# Patient Record
Sex: MALE | Race: WHITE | URBAN - METROPOLITAN AREA
[De-identification: names, ages, dates, MRNs, and addresses within clinical notes are randomized per-mention and may not be internally consistent; named-entity substitution may affect disease eponyms.]

---

## 2017-06-01 ENCOUNTER — TELEPHONE (OUTPATIENT)
Dept: FAMILY MEDICINE | Facility: CLINIC | Age: 32
End: 2017-06-01

## 2017-06-01 ENCOUNTER — OFFICE VISIT (OUTPATIENT)
Dept: FAMILY MEDICINE | Facility: CLINIC | Age: 32
End: 2017-06-01
Payer: COMMERCIAL

## 2017-06-01 VITALS
TEMPERATURE: 98 F | SYSTOLIC BLOOD PRESSURE: 126 MMHG | DIASTOLIC BLOOD PRESSURE: 80 MMHG | BODY MASS INDEX: 25.37 KG/M2 | HEART RATE: 88 BPM | WEIGHT: 203 LBS

## 2017-06-01 DIAGNOSIS — R00.2 PALPITATIONS: Primary | ICD-10-CM

## 2017-06-01 DIAGNOSIS — F41.9 ANXIETY: ICD-10-CM

## 2017-06-01 LAB
ALBUMIN SERPL-MCNC: 4.2 G/DL (ref 3.4–5)
ALP SERPL-CCNC: 59 U/L (ref 40–150)
ALT SERPL W P-5'-P-CCNC: 22 U/L (ref 0–70)
ANION GAP SERPL CALCULATED.3IONS-SCNC: 10 MMOL/L (ref 3–14)
AST SERPL W P-5'-P-CCNC: 14 U/L (ref 0–45)
BILIRUB SERPL-MCNC: 0.4 MG/DL (ref 0.2–1.3)
BUN SERPL-MCNC: 26 MG/DL (ref 7–30)
CALCIUM SERPL-MCNC: 8.9 MG/DL (ref 8.5–10.1)
CHLORIDE SERPL-SCNC: 105 MMOL/L (ref 94–109)
CO2 SERPL-SCNC: 26 MMOL/L (ref 20–32)
CREAT SERPL-MCNC: 1.16 MG/DL (ref 0.66–1.25)
GFR SERPL CREATININE-BSD FRML MDRD: 74 ML/MIN/1.7M2
GLUCOSE SERPL-MCNC: 106 MG/DL (ref 70–99)
HGB BLD-MCNC: 16.6 G/DL (ref 13.3–17.7)
POTASSIUM SERPL-SCNC: 3.8 MMOL/L (ref 3.4–5.3)
PROT SERPL-MCNC: 8 G/DL (ref 6.8–8.8)
SODIUM SERPL-SCNC: 141 MMOL/L (ref 133–144)
TSH SERPL DL<=0.05 MIU/L-ACNC: 3.99 MU/L (ref 0.4–4)

## 2017-06-01 PROCEDURE — 80053 COMPREHEN METABOLIC PANEL: CPT | Performed by: FAMILY MEDICINE

## 2017-06-01 PROCEDURE — 99214 OFFICE O/P EST MOD 30 MIN: CPT | Performed by: FAMILY MEDICINE

## 2017-06-01 PROCEDURE — 93000 ELECTROCARDIOGRAM COMPLETE: CPT | Mod: 59 | Performed by: FAMILY MEDICINE

## 2017-06-01 PROCEDURE — 36415 COLL VENOUS BLD VENIPUNCTURE: CPT | Performed by: FAMILY MEDICINE

## 2017-06-01 PROCEDURE — 85018 HEMOGLOBIN: CPT | Performed by: FAMILY MEDICINE

## 2017-06-01 PROCEDURE — 84443 ASSAY THYROID STIM HORMONE: CPT | Performed by: FAMILY MEDICINE

## 2017-06-01 NOTE — MR AVS SNAPSHOT
After Visit Summary   6/1/2017    Maximo Barr    MRN: [id number]           Patient Information     Date Of Birth          1985        Visit Information        Provider Department      6/1/2017 10:20 AM Aravind Frey MD [facility] [place]        Today's Diagnoses     Palpitations    -  1    Anxiety           Follow-ups after your visit        Future tests that were ordered for you today     Open Future Orders        Priority Expected Expires Ordered    Holter Monitor 48 hour - Adult Routine  7/16/2017 6/1/2017            Who to contact     If you have questions or need follow up information about today's clinic visit or your schedule please contact [facility] [place] directly at [phone].  Normal or non-critical lab and imaging results will be communicated to you by [employer], letter or phone within 4 business days after the clinic has received the results. If you do not hear from us within 7 days, please contact the clinic through [employer] or phone. If you have a critical or abnormal lab result, we will notify you by phone as soon as possible.  Submit refill requests through [employer] or call your pharmacy and they will forward the refill request to us. Please allow 3 business days for your refill to be completed.          Additional Information About Your Visit        MyChart Information     [employer] gives you secure access to your electronic health record. If you see a primary care provider, you can also send messages to your care team and make appointments. If you have questions, please call your primary care clinic.  If you do not have a primary care provider, please call [phone] and they will assist you.        Care EveryWhere ID     This is your Care EveryWhere ID. This could be used by other organizations to access your [facility] medical records  [id number]        Your Vitals Were     Pulse Temperature BMI (Body Mass Index)             88 98  F (36.7  C)  (Tympanic) 25.37 kg/m2          Blood Pressure from Last 3 Encounters:   06/01/17 126/80   02/12/13 118/77   12/17/12 136/91    Weight from Last 3 Encounters:   06/01/17 203 lb (92.1 kg)   02/12/13 182 lb (82.6 kg)   12/17/12 186 lb (84.4 kg)              We Performed the Following     Comprehensive metabolic panel     EKG 12-lead complete w/read - Clinics     Hemoglobin     TSH        Primary Care Provider    None Specified       No primary provider on file.        Thank you!     Thank you for choosing [facility] [place]  for your care. Our goal is always to provide you with excellent care. Hearing back from our patients is one way we can continue to improve our services. Please take a few minutes to complete the written survey that you may receive in the mail after your visit with us. Thank you!             Your Updated Medication List - Protect others around you: Learn how to safely use, store and throw away your medicines at www.disposemymeds.org.      Notice  As of 6/1/2017 10:55 AM    You have not been prescribed any medications.

## 2017-06-01 NOTE — PROGRESS NOTES
SUBJECTIVE:                                                    Maximo Barr is a 32 year old male who presents to clinic today for the following health issues:      Concern - papitations     Onset: over the past week - worsening     Started school, had a court date of DUI. Feels heart palpitation, also some time sweating episodes.    No chest pain or dizziness.    Has issues with underline anxiety. Denies current palpitations    Description:   bp has been elevated 140s/, elevated resting HR per pt     Intensity: moderate    Progression of Symptoms:  worsening    Accompanying Signs & Symptoms:  difficulty getting a full breath occasionally        Previous history of similar problem:   No    Precipitating factors:   Worsened by: Nothing    Alleviating factors:  Improved by: Nothing       Therapies Tried and outcome: NONE         Problem list and histories reviewed & adjusted, as indicated.  Additional history: as documented    Patient Active Problem List   Diagnosis     CARDIOVASCULAR SCREENING; LDL GOAL LESS THAN 160     Anxiety     Tobacco abuse     Fatigue     Pre-diabetes     Vitamin D deficiency disease     Postconcussion syndrome     Smoker     Hip region mass     GERD (gastroesophageal reflux disease)     Past Surgical History:   Procedure Laterality Date     NO HISTORY OF SURGERY         Social History   Substance Use Topics     Smoking status: Former Smoker     Packs/day: 1.00     Years: 12.00     Types: Cigarettes     Quit date: 1/1/2015     Smokeless tobacco: Former User     Types: Chew     Alcohol use Yes     Family History   Problem Relation Age of Onset     DIABETES Other      DIABETES Maternal Uncle            Reviewed and updated as needed this visit by clinical staff  Tobacco  Allergies  Meds  Soc Hx      Reviewed and updated as needed this visit by Provider         ROS:  C: NEGATIVE for fever, chills, change in weight  E/M: NEGATIVE for ear, mouth and throat problems  R: NEGATIVE  for significant cough or SOB  CV: POSITIVE for palpitations    OBJECTIVE:                                                    /80 (BP Location: Right arm)  Pulse 88  Temp 98  F (36.7  C) (Tympanic)  Wt 203 lb (92.1 kg)  BMI 25.37 kg/m2  Body mass index is 25.37 kg/(m^2).   GENERAL: healthy, alert, well nourished, well hydrated, no distress  HENT: ear canals- normal; TMs- normal; Nose- normal; Mouth- no ulcers, no lesions  NECK: no tenderness, no adenopathy, no asymmetry, no masses, no stiffness; thyroid- normal to palpation  RESP: lungs clear to auscultation - no rales, no rhonchi, no wheezes  CV: regular rates and rhythm, normal S1 S2, no S3 or S4 and no murmur, no click or rub -         ASSESSMENT/PLAN:                                                        ICD-10-CM    1. Palpitations R00.2 Comprehensive metabolic panel     TSH     Hemoglobin     EKG 12-lead complete w/read - Clinics     Holter Monitor 48 hour - Adult   2. Anxiety F41.9        Patient EKG is normal, will do some labs and follow up on that.   Will do holter monitor and further evaluate,  He has underline anxiety which could be contributing to his symptoms and recent stressors, advice relaxation techniques, if its worse we can talk about low dose SSRI which can be helpful.  Warning signs were discussed with the patient.    Aravind Frey MD  [facility] [place]

## 2017-06-01 NOTE — TELEPHONE ENCOUNTER
Patient calling to schedule appointment to be evaluated has he has recently been having palpitations and BP has been elevated - 140s/90-100s. Palpitations started over the last week. Denies chest pain, sob, sweating, n/v, lightheadedness/ dizziness, fever/ chills, cough. States he has had heartburn lately. Noticed that if he eats right before he goes to bed he will wake up with palpitations. Palpitations seem to get better when he is exerting himself. Patient scheduled for 11:20 today with Dr. Frey. Routing to provider as AGUEDA.   Cristy Medina RN   [facility] - Triage

## 2017-06-01 NOTE — NURSING NOTE
"Chief Complaint   Patient presents with     Palpitations       Initial /80 (BP Location: Right arm)  Pulse 88  Temp 98  F (36.7  C) (Tympanic)  Wt 203 lb (92.1 kg)  BMI 25.37 kg/m2 Estimated body mass index is 25.37 kg/(m^2) as calculated from the following:    Height as of 12/17/12: 6' 3\" (1.905 m).    Weight as of this encounter: 203 lb (92.1 kg).  Medication Reconciliation: complete    No current outpatient prescriptions on file.       Jose David SOLIS CMA  "

## 2017-06-01 NOTE — LETTER
[address] [place] [address]   [phone]      June 6, 2017      Maximo Barr  [address]  [place] [address]            Dear Maximo,      I have reviewed your recent labs. Here are the results:     -Liver and gallbladder tests are normal. (ALT,AST, Alk phos, bilirubin), kidney function is normal (Cr, GFR), Sodium is normal, Potassium is normal, Calcium is normal, Glucose is normal (diabetes screening test).   -TSH (thyroid stimulating hormone) level is normal which indicates normal thyroid function.   -Hemoglobin is normal.  There is no evidence of anemia.   Enclosed is a copy of the results.  If you have any questions or concerns, please call the clinic at [phone] and make a follow up appointment with me.    Results for orders placed or performed in visit on 06/01/17   Comprehensive metabolic panel   Result Value Ref Range    Sodium 141 133 - 144 mmol/L    Potassium 3.8 3.4 - 5.3 mmol/L    Chloride 105 94 - 109 mmol/L    Carbon Dioxide 26 20 - 32 mmol/L    Anion Gap 10 3 - 14 mmol/L    Glucose 106 (H) 70 - 99 mg/dL    Urea Nitrogen 26 7 - 30 mg/dL    Creatinine 1.16 0.66 - 1.25 mg/dL    GFR Estimate 74 >60 mL/min/1.7m2    GFR Estimate If Black 89 >60 mL/min/1.7m2    Calcium 8.9 8.5 - 10.1 mg/dL    Bilirubin Total 0.4 0.2 - 1.3 mg/dL    Albumin 4.2 3.4 - 5.0 g/dL    Protein Total 8.0 6.8 - 8.8 g/dL    Alkaline Phosphatase 59 40 - 150 U/L    ALT 22 0 - 70 U/L    AST 14 0 - 45 U/L   TSH   Result Value Ref Range    TSH 3.99 0.40 - 4.00 mU/L   Hemoglobin   Result Value Ref Range    Hemoglobin 16.6 13.3 - 17.7 g/dL       Sincerely,    Aravind Frey M.D.

## 2017-06-27 ENCOUNTER — HOSPITAL ENCOUNTER (OUTPATIENT)
Dept: CARDIOLOGY | Facility: CLINIC | Age: 32
Discharge: HOME | End: 2017-06-27
Attending: FAMILY MEDICINE | Admitting: FAMILY MEDICINE
Payer: COMMERCIAL

## 2017-06-27 DIAGNOSIS — R00.2 PALPITATIONS: ICD-10-CM

## 2017-06-27 PROCEDURE — 93226 XTRNL ECG REC<48 HR SCAN A/R: CPT

## 2017-06-27 PROCEDURE — 93227 XTRNL ECG REC<48 HR R&I: CPT | Performed by: INTERNAL MEDICINE

## 2017-07-05 ENCOUNTER — TELEPHONE (OUTPATIENT)
Dept: FAMILY MEDICINE | Facility: CLINIC | Age: 32
End: 2017-07-05

## 2018-04-04 ENCOUNTER — OFFICE VISIT (OUTPATIENT)
Dept: FAMILY MEDICINE | Facility: CLINIC | Age: 33
End: 2018-04-04
Payer: COMMERCIAL

## 2018-04-04 VITALS
TEMPERATURE: 98.5 F | DIASTOLIC BLOOD PRESSURE: 85 MMHG | SYSTOLIC BLOOD PRESSURE: 125 MMHG | HEART RATE: 84 BPM | WEIGHT: 205 LBS | OXYGEN SATURATION: 97 % | BODY MASS INDEX: 25.49 KG/M2 | HEIGHT: 75 IN

## 2018-04-04 DIAGNOSIS — J30.2 SEASONAL ALLERGIC RHINITIS, UNSPECIFIED CHRONICITY, UNSPECIFIED TRIGGER: ICD-10-CM

## 2018-04-04 DIAGNOSIS — R07.0 THROAT PAIN: Primary | ICD-10-CM

## 2018-04-04 LAB
BACTERIA SPEC CULT: NORMAL
BACTERIA SPEC CULT: NORMAL
DEPRECATED S PYO AG THROAT QL EIA: NORMAL
SPECIMEN SOURCE: NORMAL
SPECIMEN SOURCE: NORMAL

## 2018-04-04 PROCEDURE — 87880 STREP A ASSAY W/OPTIC: CPT | Performed by: FAMILY MEDICINE

## 2018-04-04 PROCEDURE — 99213 OFFICE O/P EST LOW 20 MIN: CPT | Performed by: FAMILY MEDICINE

## 2018-04-04 PROCEDURE — 87081 CULTURE SCREEN ONLY: CPT | Performed by: FAMILY MEDICINE

## 2018-04-04 RX ORDER — FEXOFENADINE HCL 180 MG/1
180 TABLET ORAL DAILY
Qty: 30 TABLET | Refills: 0 | Status: SHIPPED | OUTPATIENT
Start: 2018-04-04

## 2018-04-04 NOTE — PROGRESS NOTES
SUBJECTIVE:   Maximo Barr is a 32 year old male who presents to clinic today for the following health issues:      Swollen Lymph Nodes       Duration: 1 week     Description  Location: both sides/center of neck area, near jana apple, pain and difficulty with swallowing . Throat feels tender. May have bene exposed to strep couple of weeks ago.  he was also feeling sick with vomiting and stomach upset couple of weeks ago but those sx have resolved now.  No associated fever or chills. May be some fatigue but it could be because of not enough sleep , as his work shift has changed recently.  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   Tenderness : yes  Swelling: Concerned with possible swollen and tender glands    has mild cough sometimes , some phlegm although cough has been there slightly longer, likely related to possible postnasal drip sometime he sneezes,  and mostly likely mild allergy related.  It does worse in winter when it get dry,  but not taking any med's  for this bc it is not too bad     Intensity:  moderate    Accompanying signs and symptoms: No sore throat     History (similar episodes/previous evaluation): None    Precipitating or alleviating factors:  New exposures:  None  Recent travel: no      Therapies tried and outcome: no    Problem list and histories reviewed & adjusted, as indicated.  Additional history: as documented    Patient Active Problem List   Diagnosis     CARDIOVASCULAR SCREENING; LDL GOAL LESS THAN 160     Anxiety     Tobacco abuse     Fatigue     Pre-diabetes     Vitamin D deficiency disease     Postconcussion syndrome     Smoker     Hip region mass     GERD (gastroesophageal reflux disease)     Past Surgical History:   Procedure Laterality Date     NO HISTORY OF SURGERY         Social History   Substance Use Topics     Smoking status: Former Smoker     Packs/day: 1.00     Years: 12.00     Types: Cigarettes     Quit  "date: 1/1/2015     Smokeless tobacco: Former User     Types: Chew     Alcohol use Yes     Family History   Problem Relation Age of Onset     DIABETES Other      DIABETES Maternal Uncle            Reviewed and updated as needed this visit by clinical staff       Reviewed and updated as needed this visit by Provider         ROS:  Constitutional, HEENT, cardiovascular, pulmonary, GI, , musculoskeletal, neuro, skin, endocrine and psych systems are negative, except as otherwise noted.    OBJECTIVE:     /85  Pulse 84  Temp 98.5  F (36.9  C) (Tympanic)  Ht 6' 2.75\" (1.899 m)  Wt 205 lb (93 kg)  SpO2 97%  BMI 25.79 kg/m2  Body mass index is 25.79 kg/(m^2).  GENERAL: healthy, alert and no distress  EYES: Eyes grossly normal to inspection, PERRL and conjunctivae and sclerae normal  HENT: ear canals and TM's normal, nasal mucosa edematous  and oral mucous membranes moist, Throat with mild pharyngeal erythema, no sinus  tenderness  NECK: no adenopathy, although reports minimal tenderness along the anterior cervical chain bilaterally, no asymmetry, masses,  and thyroid normal to palpation  RESP: lungs clear to auscultation - no rales, rhonchi or wheezes  CV: regular rate and rhythm, normal S1 S2, no S3 or S4,   ABDOMEN: soft, nontender, no hepatosplenomegaly, no masses and bowel sounds normal      ASSESSMENT/PLAN:       (R07.0) Throat pain  (primary encounter diagnosis)  Comment:   Plan: Strep, Rapid Screen, fexofenadine (ALLEGRA) 180        MG tablet            (J30.2) Seasonal allergic rhinitis, unspecified chronicity, unspecified trigger  Comment:   Plan:     Discussed possible differential diagnosis for his throat discomfort.   Rapid strep negative, strep culture pending. Call and treat only if positive.  He possible had some recent URI, also suspect that he has some allergies, postnasal drip could be causing some throat discomfort.  Is willing to try OTC Allegra to see if that would help   in the meantime " cares and symptomatic treatment discussed.  He will do follow up if improvement or any ongoing problem       Patient expressed understanding and agreement with treatment plan. All patient's questions were answered, will let me know if has more later.  Medications: Rx's: Reviewed the potential side effects/complications of medications prescribed.       Emiliana Coy MD  [facility] [place]

## 2018-04-04 NOTE — PATIENT INSTRUCTIONS
Understanding Nasal Allergies  Nasal allergies (also called allergic rhinitis) are a common health problem. They may be seasonal. This means they cause symptoms only at certain times of the year. Or they may be perennial. This means they cause symptoms all year long. Other health problems, such as asthma, often occur along with allergies as well.    What is an allergic reaction?  An allergy is a reaction to a substance called an allergen. Common allergens include:    Wind-borne pollen    Mold    Dust mites    Furry and feathered animals    Cockroaches  Normally, allergens are harmless. But when a person has allergies, the body thinks they are harmful. The body then attacks allergens with antibodies. Antibodies are attached to special cells called mast cells. Allergens stick to the antibodies. This makes the mast cells release histamine and other chemicals. This is an allergic reaction. The chemicals irritate nearby nasal tissue. This causes nasal allergy symptoms.  Common nasal allergy symptoms  Allergies can cause nasal tissue to swell. This makes the air passages smaller. The nose may feel stuffed up. The nose may also make extra mucus, which can plug the nasal passages or drip out of the nose. Mucus can drip down the back of the throat (postnasal drip) as well. Sinus tissue can swell. This may cause pain and headache. Common allergy symptoms include:    Runny nose with clear, watery discharge    Stuffy nose (nasal congestion)    Drainage down your throat (postnasal drip)    Sneezing    Red, watery eyes    Itchy nose, eyes, ears, and throat    Plugged-up ears (ear congestion)    Sore throat    Coughing    Sinus pain and swelling    Headache  It may not be allergies  Other health problems can cause symptoms like those of nasal allergies. These include:    Nonallergic rhinitis and viruses such as colds    Irritants and pollutants, such as strong odors or smoke    Certain medicines    Changes in the weather    Treatment  Your healthcare provider will evaluate you to find the cause of your symptoms then recommend treatment. If your symptoms are due to nasal allergies, your healthcare provider may prescribe nasal steroid sprays or oral antihistamines to help reduce symptoms. Avoidance of the allergen will also be suggested. You may also be referred to an allergist.   Date Last Reviewed: 10/1/2016    8232-3939 [employer]. [address] [place] [address], [place] [address]. All rights reserved. This information is not intended as a substitute for professional medical care. Always follow your healthcare professional's instructions.

## 2018-04-04 NOTE — NURSING NOTE
"Chief Complaint   Patient presents with     Mass       Initial /85  Pulse 84  Temp 98.5  F (36.9  C) (Tympanic)  Ht 6' 2.75\" (1.899 m)  Wt 205 lb (93 kg)  SpO2 97%  BMI 25.79 kg/m2 Estimated body mass index is 25.79 kg/(m^2) as calculated from the following:    Height as of this encounter: 6' 2.75\" (1.899 m).    Weight as of this encounter: 205 lb (93 kg).  Medication Reconciliation: complete  "

## 2018-04-04 NOTE — MR AVS SNAPSHOT
After Visit Summary   4/4/2018    Maximo Barr    MRN: [id number]           Patient Information     Date Of Birth          1985        Visit Information        Provider Department      4/4/2018 9:20 AM Emiliana Coy MD [facility] [place]        Today's Diagnoses     Throat pain    -  1    Seasonal allergic rhinitis, unspecified chronicity, unspecified trigger          Care Instructions      Understanding Nasal Allergies  Nasal allergies (also called allergic rhinitis) are a common health problem. They may be seasonal. This means they cause symptoms only at certain times of the year. Or they may be perennial. This means they cause symptoms all year long. Other health problems, such as asthma, often occur along with allergies as well.    What is an allergic reaction?  An allergy is a reaction to a substance called an allergen. Common allergens include:    Wind-borne pollen    Mold    Dust mites    Furry and feathered animals    Cockroaches  Normally, allergens are harmless. But when a person has allergies, the body thinks they are harmful. The body then attacks allergens with antibodies. Antibodies are attached to special cells called mast cells. Allergens stick to the antibodies. This makes the mast cells release histamine and other chemicals. This is an allergic reaction. The chemicals irritate nearby nasal tissue. This causes nasal allergy symptoms.  Common nasal allergy symptoms  Allergies can cause nasal tissue to swell. This makes the air passages smaller. The nose may feel stuffed up. The nose may also make extra mucus, which can plug the nasal passages or drip out of the nose. Mucus can drip down the back of the throat (postnasal drip) as well. Sinus tissue can swell. This may cause pain and headache. Common allergy symptoms include:    Runny nose with clear, watery discharge    Stuffy nose (nasal congestion)    Drainage down your throat (postnasal  drip)    Sneezing    Red, watery eyes    Itchy nose, eyes, ears, and throat    Plugged-up ears (ear congestion)    Sore throat    Coughing    Sinus pain and swelling    Headache  It may not be allergies  Other health problems can cause symptoms like those of nasal allergies. These include:    Nonallergic rhinitis and viruses such as colds    Irritants and pollutants, such as strong odors or smoke    Certain medicines    Changes in the weather   Treatment  Your healthcare provider will evaluate you to find the cause of your symptoms then recommend treatment. If your symptoms are due to nasal allergies, your healthcare provider may prescribe nasal steroid sprays or oral antihistamines to help reduce symptoms. Avoidance of the allergen will also be suggested. You may also be referred to an allergist.   Date Last Reviewed: 10/1/2016    8615-4290 [employer]. [address] [place] [address]. All rights reserved. This information is not intended as a substitute for professional medical care. Always follow your healthcare professional's instructions.                Follow-ups after your visit        Follow-up notes from your care team     Return in about 4 weeks (around 5/2/2018).      Who to contact     If you have questions or need follow up information about today's clinic visit or your schedule please contact [facility] [place] directly at [phone].  Normal or non-critical lab and imaging results will be communicated to you by MyChart, letter or phone within 4 business days after the clinic has received the results. If you do not hear from us within 7 days, please contact the clinic through MyChart or phone. If you have a critical or abnormal lab result, we will notify you by phone as soon as possible.  Submit refill requests through [employer] or call your pharmacy and they will forward the refill request to us. Please allow 3 business days for your refill to be completed.           "Additional Information About Your Visit        MyChart Information     [employer] gives you secure access to your electronic health record. If you see a primary care provider, you can also send messages to your care team and make appointments. If you have questions, please call your primary care clinic.  If you do not have a primary care provider, please call [phone] and they will assist you.        Care EveryWhere ID     This is your Care EveryWhere ID. This could be used by other organizations to access your [facility] medical records  [id number]        Your Vitals Were     Pulse Temperature Height Pulse Oximetry BMI (Body Mass Index)       84 98.5  F (36.9  C) (Tympanic) 6' 2.75\" (1.899 m) 97% 25.79 kg/m2        Blood Pressure from Last 3 Encounters:   04/04/18 125/85   06/01/17 126/80   02/12/13 118/77    Weight from Last 3 Encounters:   04/04/18 205 lb (93 kg)   06/01/17 203 lb (92.1 kg)   02/12/13 182 lb (82.6 kg)              We Performed the Following     Beta strep group A culture     Strep, Rapid Screen          Today's Medication Changes          These changes are accurate as of 4/4/18 10:44 AM.  If you have any questions, ask your nurse or doctor.               Start taking these medicines.        Dose/Directions    fexofenadine 180 MG tablet   Commonly known as:  ALLEGRA   Used for:  Throat pain   Started by:  Emiliana Coy MD        Dose:  180 mg   Take 1 tablet (180 mg) by mouth daily   Quantity:  30 tablet   Refills:  0            Where to get your medicines      These medications were [place] [facility] [place] [facility] [place], [address]  [address] [facility] [address]     Phone:  [phone]     fexofenadine 180 MG tablet                Primary Care Provider     Stacy Bello MD       No address on file        Equal Access to Services     DINH NICOLAS AH: Samaria Blanco, wamichaelda joshua, qaybta kacruz sands " joselyn macjalyn burrowsaan ah. So [facility] [phone].    ATENCIÓN: Si habla yury, tiene a kennedy disposición servicios gratuitos de asistencia lingüística. Teresa al [phone].    We comply with applicable federal civil rights laws and [place] laws. We do not discriminate on the basis of race, color, national origin, age, disability, sex, sexual orientation, or gender identity.            Thank you!     [facility]. Our goal is always to provide you with excellent care. Hearing back from our patients is one way we can continue to improve our services. Please take a few minutes to complete the written survey that you may receive in the mail after your visit with us. Thank you!             Your Updated Medication List - Protect others around you: Learn how to safely use, store and throw away your medicines at www.disposemymeds.org.          This list is accurate as of 4/4/18 10:44 AM.  Always use your most recent med list.                   Brand Name Dispense Instructions for use Diagnosis    fexofenadine 180 MG tablet    ALLEGRA    30 tablet    Take 1 tablet (180 mg) by mouth daily    Throat pain

## 2018-04-05 LAB
BACTERIA SPEC CULT: NORMAL
SPECIMEN SOURCE: NORMAL

## 2018-12-03 ENCOUNTER — OFFICE VISIT (OUTPATIENT)
Dept: INTERNAL MEDICINE | Facility: CLINIC | Age: 33
End: 2018-12-03
Payer: COMMERCIAL

## 2018-12-03 VITALS
WEIGHT: 212 LBS | SYSTOLIC BLOOD PRESSURE: 110 MMHG | TEMPERATURE: 98.8 F | DIASTOLIC BLOOD PRESSURE: 68 MMHG | HEART RATE: 71 BPM | HEIGHT: 75 IN | OXYGEN SATURATION: 96 % | BODY MASS INDEX: 26.36 KG/M2

## 2018-12-03 DIAGNOSIS — R31.0 GROSS HEMATURIA: ICD-10-CM

## 2018-12-03 DIAGNOSIS — R35.0 URINARY FREQUENCY: Primary | ICD-10-CM

## 2018-12-03 LAB
ALBUMIN UR-MCNC: NEGATIVE MG/DL
APPEARANCE UR: CLEAR
BILIRUB UR QL STRIP: NEGATIVE
COLOR UR AUTO: YELLOW
GLUCOSE UR STRIP-MCNC: NEGATIVE MG/DL
HGB UR QL STRIP: ABNORMAL
KETONES UR STRIP-MCNC: NEGATIVE MG/DL
LEUKOCYTE ESTERASE UR QL STRIP: NEGATIVE
NITRATE UR QL: NEGATIVE
PH UR STRIP: 7.5 PH (ref 5–7)
RBC #/AREA URNS AUTO: NORMAL /HPF
SOURCE: ABNORMAL
SP GR UR STRIP: 1.01 (ref 1–1.03)
UROBILINOGEN UR STRIP-ACNC: 0.2 EU/DL (ref 0.2–1)
WBC #/AREA URNS AUTO: NORMAL /HPF

## 2018-12-03 PROCEDURE — 99213 OFFICE O/P EST LOW 20 MIN: CPT | Performed by: INTERNAL MEDICINE

## 2018-12-03 PROCEDURE — 81001 URINALYSIS AUTO W/SCOPE: CPT | Performed by: INTERNAL MEDICINE

## 2018-12-03 NOTE — MR AVS SNAPSHOT
After Visit Summary   12/3/2018    Maximo Garvin    MRN: [id number]           Patient Information     Date Of Birth          1985        Visit Information        Provider Department      12/3/2018 11:20 AM Sahil Cotto MD [facility]        Today's Diagnoses     Urinary frequency    -  1    Gross hematuria           Follow-ups after your visit        Future tests that were ordered for you today     Open Future Orders        Priority Expected Expires Ordered    US Renal Complete Routine  12/3/2019 12/3/2018            Who to contact     If you have questions or need follow up information about today's clinic visit or your schedule please contact [facility] directly at [phone].  Normal or non-critical lab and imaging results will be communicated to you by MyChart, letter or phone within 4 business days after the clinic has received the results. If you do not hear from us within 7 days, please contact the clinic through [employer] or phone. If you have a critical or abnormal lab result, we will notify you by phone as soon as possible.  Submit refill requests through [email or web address] or call your pharmacy and they will forward the refill request to us. Please allow 3 business days for your refill to be completed.          Additional Information About Your Visit        MyChart Information     [email or web address] gives you secure access to your electronic health record. If you see a primary care provider, you can also send messages to your care team and make appointments. If you have questions, please call your primary care clinic.  If you do not have a primary care provider, please call [phone] and they will assist you.        Care EveryWhere ID     This is your Care EveryWhere ID. This could be used by other organizations to access your [facility] medical records  [id number]        Your Vitals Were     Pulse Temperature Height Pulse Oximetry BMI (Body Mass Index)       71  "98.8  F (37.1  C) (Oral) 6' 2.75\" (1.899 m) 96% 26.68 kg/m2        Blood Pressure from Last 3 Encounters:   12/03/18 110/68   04/04/18 125/85   06/01/17 126/80    Weight from Last 3 Encounters:   12/03/18 212 lb (96.2 kg)   04/04/18 205 lb (93 kg)   06/01/17 203 lb (92.1 kg)              We Performed the Following     UA reflex to Microscopic and Culture     Urine Microscopic        Primary Care Provider     Clinic MD Eugenia       No address on file        Equal Access to Services     [facility]: Hadporfirio Blanco, elyse lewis, jimena morel, cruz velasquez . So [facility] [phone].    ATENCIÓN: Si habla español, tiene a kennedy disposición servicios gratuitos de asistencia lingüística. Llame al [phone].    We comply with applicable federal civil rights laws and [place] laws. We do not discriminate on the basis of race, color, national origin, age, disability, sex, sexual orientation, or gender identity.            Thank you!     Thank you for choosing [facility]  for your care. Our goal is always to provide you with excellent care. Hearing back from our patients is one way we can continue to improve our services. Please take a few minutes to complete the written survey that you may receive in the mail after your visit with us. Thank you!             Your Updated Medication List - Protect others around you: Learn how to safely use, store and throw away your medicines at www.disposemymeds.org.          This list is accurate as of 12/3/18  1:35 PM.  Always use your most recent med list.                   Brand Name Dispense Instructions for use Diagnosis    fexofenadine 180 MG tablet    ALLEGRA    30 tablet    Take 1 tablet (180 mg) by mouth daily    Throat pain         "

## 2018-12-03 NOTE — NURSING NOTE
"Vital signs:  Temp: 98.8  F (37.1  C) Temp src: Oral BP: 110/68 Pulse: 71     SpO2: 96 %     Height: 6' 2.75\" (189.9 cm) Weight: 212 lb (96.2 kg)  Estimated body mass index is 26.68 kg/(m^2) as calculated from the following:    Height as of this encounter: 6' 2.75\" (1.899 m).    Weight as of this encounter: 212 lb (96.2 kg).          "

## 2018-12-03 NOTE — PROGRESS NOTES
"  SUBJECTIVE:   Maximo Garvin is a 33 year old male who presents to clinic today for the following health issues:      Urinary frequency and discomfort:    Presents with sympoms of urinary frequency, burning. Had blood in the urine. Now has cleared.  Present for 2 days. No  Suprapubic or flank tenderness. No fevers, chills. No nausea, vomiting. No prior history of UTIs. No chronic medical conditions.        Problem list and histories reviewed & adjusted, as indicated.  Additional history: none    Patient Active Problem List   Diagnosis     CARDIOVASCULAR SCREENING; LDL GOAL LESS THAN 160     Anxiety     Tobacco abuse     Fatigue     Pre-diabetes     Vitamin D deficiency disease     Postconcussion syndrome     Smoker     Hip region mass     GERD (gastroesophageal reflux disease)     Past Surgical History:   Procedure Laterality Date     NO HISTORY OF SURGERY         Social History   Substance Use Topics     Smoking status: Former Smoker     Packs/day: 1.00     Years: 12.00     Types: Cigarettes     Quit date: 1/1/2015     Smokeless tobacco: Former User     Types: Chew     Alcohol use Yes      Comment: moderate     Family History   Problem Relation Age of Onset     Diabetes Other      Diabetes Maternal Uncle          Current Outpatient Prescriptions   Medication Sig Dispense Refill     fexofenadine (ALLEGRA) 180 MG tablet Take 1 tablet (180 mg) by mouth daily 30 tablet 0       Reviewed and updated as needed this visit by clinical staff       Reviewed and updated as needed this visit by Provider         ROS:  Constitutional, HEENT, cardiovascular, pulmonary, gi and gu systems are negative, except as otherwise noted.    OBJECTIVE:     /68  Pulse 71  Temp 98.8  F (37.1  C) (Oral)  Ht 6' 2.75\" (1.899 m)  Wt 212 lb (96.2 kg)  SpO2 96%  BMI 26.68 kg/m2  Body mass index is 26.68 kg/(m^2).   GENERAL: healthy, alert and no distress  NECK: no adenopathy, no asymmetry, masses, or scars and thyroid normal to " palpation  RESP: lungs clear to auscultation - no rales, rhonchi or wheezes  CV: regular rate and rhythm, normal S1 S2, no S3 or S4, no murmur, click or rub, no peripheral edema and peripheral pulses strong  ABDOMEN: soft, nontender, no hepatosplenomegaly, no masses and bowel sounds normal  MS: no gross musculoskeletal defects noted, no edema    Diagnostic Test Results:  Results for orders placed or performed in visit on 12/03/18 (from the past 24 hour(s))   UA reflex to Microscopic and Culture   Result Value Ref Range    Color Urine Yellow     Appearance Urine Clear     Glucose Urine Negative NEG^Negative mg/dL    Bilirubin Urine Negative NEG^Negative    Ketones Urine Negative NEG^Negative mg/dL    Specific Gravity Urine 1.015 1.003 - 1.035    Blood Urine Small (A) NEG^Negative    pH Urine 7.5 (H) 5.0 - 7.0 pH    Protein Albumin Urine Negative NEG^Negative mg/dL    Urobilinogen Urine 0.2 0.2 - 1.0 EU/dL    Nitrite Urine Negative NEG^Negative    Leukocyte Esterase Urine Negative NEG^Negative    Source Midstream Urine    Urine Microscopic   Result Value Ref Range    WBC Urine 0 - 5 OTO5^0 - 5 /HPF    RBC Urine O - 2 OTO2^O - 2 /HPF       ASSESSMENT/PLAN:     Problem List Items Addressed This Visit     None      Visit Diagnoses     Urinary frequency    -  Primary    Relevant Orders    UA reflex to Microscopic and Culture (Completed)    Urine Microscopic (Completed)    US Renal Complete    Gross hematuria        Relevant Orders    US Renal Complete           Assess UA, negative for UTI, positive for blood  Possible kidney stone, assess renal US  Keep fluid intake   Reassess with recurrent or changed symptoms      Follow-Up:with results     Sahil Cotto MD  [facility]

## 2018-12-10 ENCOUNTER — HOSPITAL ENCOUNTER (OUTPATIENT)
Dept: ULTRASOUND IMAGING | Facility: CLINIC | Age: 33
Discharge: HOME | End: 2018-12-10
Attending: INTERNAL MEDICINE | Admitting: INTERNAL MEDICINE
Payer: COMMERCIAL

## 2018-12-10 DIAGNOSIS — R35.0 URINARY FREQUENCY: ICD-10-CM

## 2018-12-10 DIAGNOSIS — R31.0 GROSS HEMATURIA: ICD-10-CM

## 2018-12-10 PROCEDURE — 76770 US EXAM ABDO BACK WALL COMP: CPT

## 2019-07-24 ENCOUNTER — ANCILLARY PROCEDURE (OUTPATIENT)
Dept: GENERAL RADIOLOGY | Facility: CLINIC | Age: 34
End: 2019-07-24
Attending: PHYSICIAN ASSISTANT
Payer: COMMERCIAL

## 2019-07-24 ENCOUNTER — OFFICE VISIT (OUTPATIENT)
Dept: INTERNAL MEDICINE | Facility: CLINIC | Age: 34
End: 2019-07-24
Payer: COMMERCIAL

## 2019-07-24 VITALS
DIASTOLIC BLOOD PRESSURE: 90 MMHG | OXYGEN SATURATION: 99 % | RESPIRATION RATE: 16 BRPM | SYSTOLIC BLOOD PRESSURE: 124 MMHG | WEIGHT: 206.7 LBS | BODY MASS INDEX: 26.01 KG/M2 | HEART RATE: 69 BPM

## 2019-07-24 DIAGNOSIS — M54.50 ACUTE LEFT-SIDED LOW BACK PAIN WITHOUT SCIATICA: ICD-10-CM

## 2019-07-24 DIAGNOSIS — M54.50 ACUTE LEFT-SIDED LOW BACK PAIN WITHOUT SCIATICA: Primary | ICD-10-CM

## 2019-07-24 PROCEDURE — 72100 X-RAY EXAM L-S SPINE 2/3 VWS: CPT

## 2019-07-24 PROCEDURE — 99213 OFFICE O/P EST LOW 20 MIN: CPT | Performed by: PHYSICIAN ASSISTANT

## 2019-07-24 NOTE — PROGRESS NOTES
Subjective     Maximo Garvin is a 34 year old male who presents to clinic today for the following health issues:      Back Pain       Duration: 2 months         Specific cause: none    Description:   Location of pain: low back left and middle of back left  At time to the left buttock   Character of pain: dull ache  Pain radiation:none  New numbness or weakness in legs, not attributed to pain:  no     Intensity: Currently 2-3/10    History:   Pain interferes with job: No  History of back problems: no prior back problems  Any previous MRI or X-rays: None  Sees a specialist for back pain:  No  Therapies tried without relief: stretches, tylenol     Alleviating factors:   Improved by: stretches       Precipitating factors:  Worsened by: Lifting and Bending  Job with a lot of walking  Goes to gym - does lift weights there.  No urinary symptoms   No blood noted.   Seen in 12/2018 for hematuria, - workup negative         Accompanying Signs & Symptoms:  Risk of Fracture:  None  Risk of Cauda Equina:  None  Risk of Infection:  None  Risk of Cancer:  None  Risk of Ankylosing Spondylitis:  Onset at age <35, male, AND morning back stiffness. no                -------------------------------------    BP Readings from Last 3 Encounters:   07/24/19 124/90   12/03/18 110/68   04/04/18 125/85    Wt Readings from Last 3 Encounters:   07/24/19 93.8 kg (206 lb 11.2 oz)   12/03/18 96.2 kg (212 lb)   04/04/18 93 kg (205 lb)                    -------------------------------------  Reviewed and updated as needed this visit by Provider  Allergies  Meds         Review of Systems   ROS COMP: Constitutional, HEENT, cardiovascular, pulmonary, gi and gu systems are negative, except as otherwise noted.      Objective    /90   Pulse 69   Resp 16   Wt 93.8 kg (206 lb 11.2 oz)   SpO2 99%   BMI 26.01 kg/m    Body mass index is 26.01 kg/m .  Physical Exam   GENERAL: healthy, alert and no distress  RESP: lungs clear to auscultation - no  rales, rhonchi or wheezes  CV: regular rate and rhythm, normal S1 S2, no S3 or S4, no murmur, click or rub, no peripheral edema and peripheral pulses strong  MS: some mild pain with flexion extension  No tenderness on palpation today   SKIN: no suspicious lesions or rashes  NEURO: Normal strength and tone, mentation intact and speech normal  BACK: no CVA tenderness, no paralumbar tenderness    Diagnostic Test Results:  Pending         Assessment & Plan     1. Acute left-sided low back pain without sciatica    - XR Lumbar Spine 2/3 Views; Future  - [facility], AND CHIROPRACTIC REFERRAL; Future       Patient Instructions   Xray today    Aleve 2 tabs Twice a day    If xray ok then PT for a few sessions.           Return in about 4 weeks (around 8/21/2019) for recheck if not improving, regular primary provider.    Kayy Nicholas PA-C  [facility]

## 2020-02-23 ENCOUNTER — HEALTH MAINTENANCE LETTER (OUTPATIENT)
Age: 35
End: 2020-02-23

## 2021-04-11 ENCOUNTER — HEALTH MAINTENANCE LETTER (OUTPATIENT)
Age: 36
End: 2021-04-11

## 2022-05-07 ENCOUNTER — HEALTH MAINTENANCE LETTER (OUTPATIENT)
Age: 37
End: 2022-05-07

## 2023-06-02 ENCOUNTER — HEALTH MAINTENANCE LETTER (OUTPATIENT)
Age: 38
End: 2023-06-02